# Patient Record
Sex: MALE | Race: WHITE | NOT HISPANIC OR LATINO | Employment: PART TIME | URBAN - METROPOLITAN AREA
[De-identification: names, ages, dates, MRNs, and addresses within clinical notes are randomized per-mention and may not be internally consistent; named-entity substitution may affect disease eponyms.]

---

## 2023-08-29 ENCOUNTER — HOSPITAL ENCOUNTER (EMERGENCY)
Facility: HOSPITAL | Age: 53
Discharge: HOME | End: 2023-08-29
Attending: STUDENT IN AN ORGANIZED HEALTH CARE EDUCATION/TRAINING PROGRAM

## 2023-08-29 VITALS
TEMPERATURE: 98 F | HEIGHT: 73 IN | HEART RATE: 94 BPM | BODY MASS INDEX: 30.48 KG/M2 | WEIGHT: 230 LBS | DIASTOLIC BLOOD PRESSURE: 77 MMHG | OXYGEN SATURATION: 100 % | RESPIRATION RATE: 16 BRPM | SYSTOLIC BLOOD PRESSURE: 147 MMHG

## 2023-08-29 DIAGNOSIS — S92.002A CLOSED NONDISPLACED FRACTURE OF LEFT CALCANEUS, UNSPECIFIED PORTION OF CALCANEUS, INITIAL ENCOUNTER: Primary | ICD-10-CM
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
## 2023-08-29 PROCEDURE — 99284 EMERGENCY DEPT VISIT MOD MDM: CPT | Mod: 25

## 2023-08-29 PROCEDURE — 29515 APPLICATION SHORT LEG SPLINT: CPT | Mod: LT

## 2023-08-29 PROCEDURE — 25000003 PHARM REV CODE 250: Performed by: NURSE PRACTITIONER

## 2023-08-29 RX ORDER — HYDROCODONE BITARTRATE AND ACETAMINOPHEN 5; 325 MG/1; MG/1
1 TABLET ORAL EVERY 8 HOURS PRN
Qty: 15 TABLET | Refills: 0 | Status: SHIPPED | OUTPATIENT
Start: 2023-08-29

## 2023-08-29 RX ORDER — DICLOFENAC SODIUM 75 MG/1
75 TABLET, DELAYED RELEASE ORAL 2 TIMES DAILY
Qty: 14 TABLET | Refills: 0 | Status: SHIPPED | OUTPATIENT
Start: 2023-08-29 | End: 2023-09-05

## 2023-08-29 RX ORDER — HYDROCODONE BITARTRATE AND ACETAMINOPHEN 5; 325 MG/1; MG/1
1 TABLET ORAL
Status: COMPLETED | OUTPATIENT
Start: 2023-08-29 | End: 2023-08-29

## 2023-08-29 RX ADMIN — HYDROCODONE BITARTRATE AND ACETAMINOPHEN 1 TABLET: 5; 325 TABLET ORAL at 02:08

## 2023-08-29 NOTE — ED PROVIDER NOTES
Encounter Date: 8/29/2023       History     Chief Complaint   Patient presents with    Ankle Pain    Foot Injury     Left ankle pain (with swelling) secondary accidental fall on yesterday. Also states right foot pain. (+)2 left dorsalis pedal pulse with discoloration and bruising noted. Gladys reid     Pt is a 52 y.o. male who presents to the [facility] ED complaining of bilateral foot and ankle pain Lt worse than Rt after he fell yesterday. Denies hitting his head, LOC, chest pain, SOB, weakness, dizziness, or loss of bowel or bladder control. Pt reports injuring Rt ankle/foot in the past and is concerned that he may have worsened that area as well. Denies seeking evaluation after initial injury. Pt denies coolness or loss of sensation to bilateral feet. Admits to worsening pain with movement and palpation.       Review of patient's allergies indicates:   Allergen Reactions    Penicillins Swelling     No past medical history on file.  No past surgical history on file.  No family history on file.     Review of Systems   Constitutional:  Negative for chills, diaphoresis, fatigue and fever.   HENT:  Negative for facial swelling, postnasal drip, rhinorrhea, sinus pressure, sinus pain, sore throat and trouble swallowing.    Respiratory:  Negative for cough, chest tightness, shortness of breath and wheezing.    Cardiovascular:  Negative for chest pain, palpitations and leg swelling.   Gastrointestinal:  Negative for abdominal pain, diarrhea, nausea and vomiting.   Genitourinary:  Negative for dysuria, flank pain, hematuria and urgency.   Musculoskeletal:  Positive for arthralgias. Negative for back pain and myalgias.   Skin:  Negative for color change and rash.   Neurological:  Negative for dizziness, syncope, weakness and headaches.   Hematological:  Does not bruise/bleed easily.   All other systems reviewed and are negative.      Physical Exam     Initial Vitals [08/29/23 1249]   BP Pulse Resp Temp SpO2   (!) 147/77 94 18 97.9  °F (36.6 °C) 100 %      MAP       --         Physical Exam    Nursing note and vitals reviewed.  Constitutional: Vital signs are normal. He appears well-developed and well-nourished.   HENT:   Head: Normocephalic.   Nose: Nose normal.   Mouth/Throat: Oropharynx is clear and moist.   Eyes: Conjunctivae and EOM are normal. Pupils are equal, round, and reactive to light.   Neck: Neck supple.   Normal range of motion.  Cardiovascular:  Normal rate, regular rhythm, normal heart sounds and intact distal pulses.           Pulmonary/Chest: Effort normal and breath sounds normal. No respiratory distress. He has no wheezes. He has no rhonchi. He has no rales. He exhibits no tenderness.   Abdominal: Abdomen is soft and flat. Bowel sounds are normal. There is no abdominal tenderness. There is no rebound, no guarding, no tenderness at McBurney's point and negative Rai's sign.   Musculoskeletal:         General: Normal range of motion.      Cervical back: Normal range of motion and neck supple.      Right ankle: No swelling. Tenderness present. Normal pulse.      Left ankle: Swelling present. Tenderness present. Normal pulse.      Right foot: Normal capillary refill. Tenderness present. No swelling. Normal pulse.      Left foot: Normal capillary refill. Swelling and tenderness present. Normal pulse.        Legs:      Neurological: He is alert and oriented to person, place, and time. He has normal strength.   Skin: Skin is warm and dry. Capillary refill takes less than 2 seconds.   Psychiatric: He has a normal mood and affect. His behavior is normal. Judgment and thought content normal.         ED Course   Splint Application    Date/Time: 8/29/2023 4:18 PM    Performed by: Daryl Murphy Jr., FNP  Authorized by: Daryl Murphy Jr., FNP  Location details: left leg  Splint type: ankle stirrup (Posterior splint)  Supplies used: cotton padding, Ortho-Glass and elastic bandage  Post-procedure: The splinted body part was  neurovascularly unchanged following the procedure.  Patient tolerance: Patient tolerated the procedure well with no immediate complications        Labs Reviewed - No data to display       Imaging Results              CT Foot Without Contrast Left (Final result)  Result time 08/29/23 16:06:48      Final result by Felix Lindsey MD (08/29/23 16:06:48)                   Narrative:    EXAMINATION  CT FOOT WITHOUT CONTRAST LEFT    CLINICAL HISTORY  Fracture, foot;    TECHNIQUE  Non-contrast helical-acquisition CT images of the left foot were obtained.  Multiplanar reconstructions accomplished by a CT technologist at a separate workstation, pushed to PACS for physician review.    COMPARISON  Routine radiographs obtained earlier the same day.    FINDINGS  Images were reviewed in bone and soft tissue windows.    Exam quality: adequate for evaluation    Severely comminuted fracture of the calcaneus with multiple sites of intra-articular extension again appreciated, as identified on the recent radiographs.  No other convincing acute osseous disruption through the scanned region is appreciated.  Well corticated osseous fragment consistent with accessory ossicle versus sequela of old trauma noted adjacent to the lateral malleolus, corresponding to questionable fracture reported on recent radiographs.  Joint spacing and alignment are preserved.  The tarsal sinus is unremarkable for limited CT assessment.  There is no significant articular effusion.  No periosteal reaction or destructive skeletal lesion is identified.    Widespread superficial soft tissue edema is present.  No expansile hematoma or other focal subcutaneous abnormality is identified.  There are no drainable collections.  The visualized musculature and tendinous structures are without acute or focal finding by limited CT assessment.    IMPRESSION  1. Comminuted intra-articular calcaneal fracture.  2. No additional convincing acute osseous abnormality.  3.  Widespread soft tissue edema.    RADIATION DOSE  Automated tube current modulation, weight-based exposure dosing, and/or iterative reconstruction technique utilized to reach lowest reasonably achievable exposure rate.    DLP: 56 mGy*cm      Electronically signed by: Felix Lindsey  Date:    08/29/2023  Time:    16:06                                     X-Ray Foot Complete Left (Final result)  Result time 08/29/23 14:13:04      Final result by Deb Church MD (08/29/23 14:13:04)                   Impression:      Comminuted, intra-articular fracture of the calcaneus without significant displacement    Questionable subtle fracture at the tip of the lateral malleolus versus confluence of shadows      Electronically signed by: Deb Church  Date:    08/29/2023  Time:    14:13               Narrative:    EXAMINATION:  XR ANKLE COMPLETE 3 VIEW LEFT; XR FOOT COMPLETE 3 VIEW LEFT    CLINICAL HISTORY:  Unspecified fall, initial encounter    TECHNIQUE:  AP, lateral and oblique views of the left ankle and left foot were performed.    COMPARISON:  None    FINDINGS:  BONES: There is a comminuted, intra-articular fracture of the calcaneus.  Questionable subtle fracture at the tip of the lateral malleolus versus confluence of shadows.      SOFT TISSUES: Nonfocal soft tissue swelling.                                           X-Ray Foot Complete Right (Final result)  Result time 08/29/23 14:16:24      Final result by Deb Church MD (08/29/23 14:16:24)                   Impression:      Deformity at the calcaneus appears chronic and remodeled suggesting old injury.  However it is difficult to exclude acute superimposed abnormality without prior for comparison.  There is soft tissue swelling and joint effusion at the ankle.      Electronically signed by: Deb Church  Date:    08/29/2023  Time:    14:16               Narrative:    EXAMINATION:  XR ANKLE COMPLETE 3 VIEW RIGHT; XR FOOT COMPLETE 3 VIEW  RIGHT    CLINICAL HISTORY:  Unspecified fall, initial encounter    TECHNIQUE:  AP, lateral, and oblique images of the right ankle and right foot were performed.    COMPARISON:  None.    FINDINGS:  There is a chronic appearing healed deformity of the distal fibula.  There is remodeled appearing deformity of the calcaneus suggesting old, healed fracture.  However is difficult to exclude acute superimposed abnormality.  There is edema at Kager's fat pad.  There is an ankle joint effusion.    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    Soft tissue swelling.                                       X-Ray Ankle Complete Right (Final result)  Result time 08/29/23 14:16:24      Final result by Deb Church MD (08/29/23 14:16:24)                   Impression:      Deformity at the calcaneus appears chronic and remodeled suggesting old injury.  However it is difficult to exclude acute superimposed abnormality without prior for comparison.  There is soft tissue swelling and joint effusion at the ankle.      Electronically signed by: Deb Church  Date:    08/29/2023  Time:    14:16               Narrative:    EXAMINATION:  XR ANKLE COMPLETE 3 VIEW RIGHT; XR FOOT COMPLETE 3 VIEW RIGHT    CLINICAL HISTORY:  Unspecified fall, initial encounter    TECHNIQUE:  AP, lateral, and oblique images of the right ankle and right foot were performed.    COMPARISON:  None.    FINDINGS:  There is a chronic appearing healed deformity of the distal fibula.  There is remodeled appearing deformity of the calcaneus suggesting old, healed fracture.  However is difficult to exclude acute superimposed abnormality.  There is edema at Kager's fat pad.  There is an ankle joint effusion.    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    Soft tissue swelling.                                       X-Ray Ankle Complete Left (Final result)  Result time 08/29/23 14:13:04      Final result by Ranjit  Deb YOU MD (08/29/23 14:13:04)                   Impression:      Comminuted, intra-articular fracture of the calcaneus without significant displacement    Questionable subtle fracture at the tip of the lateral malleolus versus confluence of shadows      Electronically signed by: Deb Church  Date:    08/29/2023  Time:    14:13               Narrative:    EXAMINATION:  XR ANKLE COMPLETE 3 VIEW LEFT; XR FOOT COMPLETE 3 VIEW LEFT    CLINICAL HISTORY:  Unspecified fall, initial encounter    TECHNIQUE:  AP, lateral and oblique views of the left ankle and left foot were performed.    COMPARISON:  None    FINDINGS:  BONES: There is a comminuted, intra-articular fracture of the calcaneus.  Questionable subtle fracture at the tip of the lateral malleolus versus confluence of shadows.      SOFT TISSUES: Nonfocal soft tissue swelling.                                           Medications   HYDROcodone-acetaminophen 5-325 mg per tablet 1 tablet (1 tablet Oral Given 8/29/23 1437)     Medical Decision Making  Differential:  Fracture  Muscle strain  Ankle sprain    Amount and/or Complexity of Data Reviewed  Radiology: ordered.    Risk  Prescription drug management.         APC / Resident Notes:   I was not physically present during the history, exam or disposition of this patient. I was available at all times for consultation. (Zmora)        ED Course as of 08/30/23 1007   Tue Aug 29, 2023   1617 4:18 PM Reassessed patient at this time. Reports condition has improved. Discussed with patient all pertinent ED information and results. Discussed diagnosis and treatment plan with patient. Follow up instructions and return to ED instruction have been given. All questions and concerns were addressed at this time. Patient voices understanding of information and instructions. Patient is comfortable with plan and discharge. Patient is stable for discharge.    [JA]      ED Course User Index  [JA] Daryl Murphy Jr., FNP                     Clinical Impression:   Final diagnoses:  [W19.XXXA] Fall  [S92.002A] Closed nondisplaced fracture of left calcaneus, unspecified portion of calcaneus, initial encounter (Primary)        ED Disposition Condition    Discharge Stable          ED Prescriptions       Medication Sig Dispense Start Date End Date Auth. Provider    diclofenac (VOLTAREN) 75 MG EC tablet Take 1 tablet (75 mg total) by mouth 2 (two) times daily. for 7 days 14 tablet 8/29/2023 9/5/2023 Daryl Murphy Jr., TONY    HYDROcodone-acetaminophen (NORCO) 5-325 mg per tablet Take 1 tablet by mouth every 8 (eight) hours as needed for Pain. 15 tablet 8/29/2023 -- Daryl Murphy Jr., FNP          Follow-up Information       Follow up With Specialties Details Why Contact Info    [facility] Dept Emergency Medicine In 3 days As needed, If symptoms worsen [address] [place] [id number] [phone]    [facility]  Schedule an appointment as soon as possible for a visit in 5 days For follow up with Orthopedic Clinic in next 5-7 days to establish care [address] [place] [id number]             Daryl Murphy Jr., FNP  08/29/23 5010       Edmond Sosa MD  08/30/23 0672

## 2023-08-29 NOTE — DISCHARGE INSTRUCTIONS
Wear splint as directed per ED instructions.  Take pain medication as prescribed.  Follow up with the [facility] as instructed.   Return to the [facility] ED immediately for coolness to affected extremity, worsening pain, or loss of sensation to affected extremity.  Nonweight bearing to affected extremity. Use crutches for ambulation.

## 2023-09-08 DIAGNOSIS — S92.062A CLOSED DISPLACED INTRA-ARTICULAR FRACTURE OF LEFT CALCANEUS, INITIAL ENCOUNTER: Primary | ICD-10-CM

## 2023-09-20 ENCOUNTER — OFFICE VISIT (OUTPATIENT)
Dept: ORTHOPEDICS | Facility: CLINIC | Age: 53
End: 2023-09-20

## 2023-09-20 ENCOUNTER — HOSPITAL ENCOUNTER (OUTPATIENT)
Dept: RADIOLOGY | Facility: HOSPITAL | Age: 53
Discharge: HOME | End: 2023-09-20
Attending: ORTHOPAEDIC SURGERY

## 2023-09-20 VITALS — HEIGHT: 73 IN | BODY MASS INDEX: 30.48 KG/M2 | WEIGHT: 230 LBS

## 2023-09-20 DIAGNOSIS — M25.572 CHRONIC PAIN OF LEFT ANKLE: Primary | ICD-10-CM

## 2023-09-20 DIAGNOSIS — G89.29 CHRONIC PAIN OF LEFT ANKLE: Primary | ICD-10-CM

## 2023-09-20 DIAGNOSIS — S92.062A CLOSED DISPLACED INTRA-ARTICULAR FRACTURE OF LEFT CALCANEUS, INITIAL ENCOUNTER: ICD-10-CM

## 2023-09-20 DIAGNOSIS — M25.572 CHRONIC PAIN OF LEFT ANKLE: ICD-10-CM

## 2023-09-20 DIAGNOSIS — G89.29 CHRONIC PAIN OF LEFT ANKLE: ICD-10-CM

## 2023-09-20 PROCEDURE — 99213 OFFICE O/P EST LOW 20 MIN: CPT | Mod: PBBFAC

## 2023-09-20 PROCEDURE — 99499 NO LOS: ICD-10-PCS | Mod: S$PBB,,, | Performed by: ORTHOPAEDIC SURGERY

## 2023-09-20 PROCEDURE — 99499 UNLISTED E&M SERVICE: CPT | Mod: S$PBB,,, | Performed by: ORTHOPAEDIC SURGERY

## 2023-09-20 PROCEDURE — 73650 X-RAY EXAM OF HEEL: CPT | Mod: TC,LT

## 2023-09-20 NOTE — PROGRESS NOTES
Faculty Attestation: Zac Sam  was seen at [facility] in the Orthopaedic Clinic. Patient chart reviewed. History of Present Illness, Physical Exam, and Assessment and Plan reviewed. Treatment plan is reasonable and appropriate. Compliance with treatment recommendations is important. No procedure was performed.     Rancho Saab MD  Orthopaedic Surgery

## 2023-09-20 NOTE — PROGRESS NOTES
[facility] Orthopaedic Surgery H&P Note  In brief, Zac Sam is a 53 y.o. male who presents to clinic for evaluation for left displaced calcaneus fracture.    HPI:  Patient is a 53-year-old male no significant past medical history we did about 2 dips a day who presents to clinic for evaluation for left displaced calcaneus fracture.  Patient states about 3-1/2 weeks ago he fell off about a 3 ft steps onto his left ankle and sustained this injury.  He was seen in the emergency department where he was placed in a splint no sole to be nonweightbearing.  He presents today for evaluation.  Pain is well-controlled.  No numbness or tingling.  Of note patient has a remote right ankle injury which hurts him more than the left calcaneus.  PMH: No past medical history on file.  PSH: No past surgical history on file.  SH:   Social History     Socioeconomic History    Marital status: Single   Tobacco Use    Smoking status: Never    Smokeless tobacco: Never     FH: No family history on file.  Allergies:   Review of patient's allergies indicates:   Allergen Reactions    Penicillins Swelling     ROS:  Constitutional- no fever, fatigue, weakness  Eye- no vision loss, eye redness, drainage, or pain  ENMT- no sore throat, ear pain, sinus pain/congestion, nasal congestion/drainage  Respiratory- no cough, wheezing, or shortness of breath  CV- no chest pain, no palpitations, no edema  GI- no N/V/D; no abdominal pain  Physical Exam:  There were no vitals filed for this visit.  General: NAD  Cardio: RRR by peripheral pulse  Pulm: Normal WOB on room air, symmetric chest rise  Abd: Soft, NT/ND  MSK:  Left lower extremity:   Superficial abrasion anteriorly from the prior splint   Sensation intact in superficial peroneal, deep peroneal, saphenous, sural, tibial nerve distribution   Motor intact EHL/FHL   Tenderness to palpation over the calcaneus   Pulses 2+     Right lower extremity:   Flatfoot deformity of the right foot   Tenderness to palpation  over the anterior ankle  No tenderness to palpation over the lateral subtalar joint or posterior tib tendon  Sensation intact to light touch distally   Pulses 2+    Imaging:   On independent review of left calcaneus radiographs and CT patient has a mildly displaced Aguirre 3 calcaneus fracture.    On independent review of right hand radiographs patient has a prior healed calcaneus fracture with mild ankle joint space narrowing and overall preservation of the subtalar joint.    Assessment/Plan:  Patient is a 53-year-old male who dips tobacco and presents to clinic for evaluation for subacute left calcaneus fracture.  Discussed operative versus nonoperative treatment at this time recommend nonoperative treatment.  Discussed that he may develop subtalar arthritis in the future that may require future intervention.  In the meantime transition patient into a Cam boot, instructed to be nonweightbearing for another 4 weeks.  Follow up in 4 weeks at that time can let patient put partial weight-bearing, avoid full weight-bearing until 12 weeks.  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 I offered the patient an ankle injection today but he declined.  We will give him an ankle brace to help provide support to his right ankle.    Medical Decision Making:  Chronic illness not yet at goal, independent review of imaging.     Josh Randle MD  [facility] Orthopaedic Surgery